# Patient Record
Sex: FEMALE | Race: BLACK OR AFRICAN AMERICAN | NOT HISPANIC OR LATINO | URBAN - METROPOLITAN AREA
[De-identification: names, ages, dates, MRNs, and addresses within clinical notes are randomized per-mention and may not be internally consistent; named-entity substitution may affect disease eponyms.]

---

## 2023-04-18 ENCOUNTER — LAB OUTSIDE AN ENCOUNTER (OUTPATIENT)
Dept: URBAN - METROPOLITAN AREA CLINIC 118 | Facility: CLINIC | Age: 58
End: 2023-04-18

## 2023-04-18 ENCOUNTER — OFFICE VISIT (OUTPATIENT)
Dept: URBAN - METROPOLITAN AREA CLINIC 118 | Facility: CLINIC | Age: 58
End: 2023-04-18
Payer: COMMERCIAL

## 2023-04-18 ENCOUNTER — DASHBOARD ENCOUNTERS (OUTPATIENT)
Age: 58
End: 2023-04-18

## 2023-04-18 VITALS
BODY MASS INDEX: 43.34 KG/M2 | HEART RATE: 101 BPM | TEMPERATURE: 97 F | HEIGHT: 69 IN | DIASTOLIC BLOOD PRESSURE: 95 MMHG | WEIGHT: 292.6 LBS | SYSTOLIC BLOOD PRESSURE: 135 MMHG

## 2023-04-18 DIAGNOSIS — I50.9 CONGESTIVE HEART FAILURE, UNSPECIFIED HF CHRONICITY, UNSPECIFIED HEART FAILURE TYPE: ICD-10-CM

## 2023-04-18 DIAGNOSIS — Z12.11 ENCOUNTER FOR SCREENING COLONOSCOPY: ICD-10-CM

## 2023-04-18 PROBLEM — 42343007: Status: ACTIVE | Noted: 2023-04-18

## 2023-04-18 PROCEDURE — 99203 OFFICE O/P NEW LOW 30 MIN: CPT | Performed by: INTERNAL MEDICINE

## 2023-04-18 PROCEDURE — 99243 OFF/OP CNSLTJ NEW/EST LOW 30: CPT | Performed by: INTERNAL MEDICINE

## 2023-04-18 NOTE — HPI-TODAY'S VISIT:
Ms. Veras is a 58 y/o F who was referred to us by Dr. Shearer for a colonoscopy. A copy of this OV will be sent to referring provider. Denies previous colonoscopy or FMHx of CRC. Denies changes in bowel habits, overt GI bleeding, abd pain or wt loss. Dx w/ CHF in 2015. She follows with Dr. Pruitt @ [facility]. Reports last EF was 28% and that she was referred to [facility] to establish care. Denies use of blood thinners. Denies SOB or use of home O2, HD or wt loss meds.